# Patient Record
(demographics unavailable — no encounter records)

---

## 2024-11-10 NOTE — PHYSICAL EXAM
[Chaperone Present] : A chaperone was present in the examining room during all aspects of the physical examination [Appropriately responsive] : appropriately responsive [Alert] : alert [No Acute Distress] : no acute distress [No Lymphadenopathy] : no lymphadenopathy [Soft] : soft [Non-tender] : non-tender [Non-distended] : non-distended [No HSM] : No HSM [No Lesions] : no lesions [No Mass] : no mass [Oriented x3] : oriented x3 [Examination Of The Breasts] : a normal appearance [No Discharge] : no discharge [No Masses] : no breast masses were palpable [Labia Majora] : normal [Labia Minora] : normal [Normal] : normal [Uterine Adnexae] : normal [FreeTextEntry2] : LINDA Pedraza

## 2024-11-10 NOTE — HISTORY OF PRESENT ILLNESS
[FreeTextEntry1] : 23 year old female LMP: 09/12/24 present for follow-up GYN visit for missed menses. Sono shows FRANCIA 06/19/25. Pt reports the pregnancy test was positive Oct 15. Pt reports she is not related to the FOB. Pt denies babies born with abnormalities. Pt reports she lost her first baby in the third trimester - 9 months, during delivery the heartbeat stopped. Pt reports when she showed up to the hospital in labor, they were not able to find a heartbeat. Pt reports she saw the doctor 2-3 days and the heartbeat was present. Pt reports the baby's weight was between 2.5-3 KG, baby was a female. Pt reports she is not aware for the cause/reason for the demise. Pt reports during the pregnancy, she was tested for GDM but the results were negative, denies BP problems. Pt denies genetic testing on the fetus. Pt reports after the first pregnancy she had 2 miscarriages, where in the 2nd miscarriage was after she was able to hear the heartbeat. Pt reports baby did not grow past 6-7 weeks.  Pt reports her parents are both healthy. Pt reports she is not taking any PNV. Pt reports she is happy about the pregnancy. Pt denies abuse, and reports she feels safe. Pt reports her partner lives with her.    [PGHxTotal] : 3 [facility] [[facility]Living] : 0 [PGHxABSpont] : 2

## 2024-11-10 NOTE — PLAN
[FreeTextEntry1] : - Patient given practice letter and [facility] pregnancy brochure. The group's support staff and coverage management discussed. - Discussed nutrition and exercise. - Reviewed prenatal screening including amnio, CVS, NIPS and nuchal. - Referral given for nuchal. - HPV/ PAP done today. - Rx for PNV and bASA given.  - Routine OB bloods today.  - Discussed timeline for monitoring.  - Consultation with MFM given due to hx of 9 month fetus passing away.  - RTO in 4 weeks.

## 2024-11-10 NOTE — END OF VISIT
[FreeTextEntry3] : This note was written by Magaly Sharma on 11/04/2024 actively solely Paula Flynn M.D.  All medical record entries made by this scribe at my, Paula Flynn M.D direction and personally dictated by me on 11/04/2024. I have personally reviewed the chart and agree that the record reflects my personal performance of the history, physical exam, assessment, and plan.

## 2024-11-10 NOTE — HISTORY OF PRESENT ILLNESS
[FreeTextEntry1] : 23 year old female LMP: 09/12/24 present for follow-up GYN visit for missed menses. Sono shows FRANCIA 06/19/25. Pt reports the pregnancy test was positive Oct 15. Pt reports she is not related to the FOB. Pt denies babies born with abnormalities. Pt reports she lost her first baby in the third trimester - 9 months, during delivery the heartbeat stopped. Pt reports when she showed up to the hospital in labor, they were not able to find a heartbeat. Pt reports she saw the doctor 2-3 days and the heartbeat was present. Pt reports the baby's weight was between 2.5-3 KG, baby was a female. Pt reports she is not aware for the cause/reason for the demise. Pt reports during the pregnancy, she was tested for GDM but the results were negative, denies BP problems. Pt denies genetic testing on the fetus. Pt reports after the first pregnancy she had 2 miscarriages, where in the 2nd miscarriage was after she was able to hear the heartbeat. Pt reports baby did not grow past 6-7 weeks.  Pt reports her parents are both healthy. Pt reports she is not taking any PNV. Pt reports she is happy about the pregnancy. Pt denies abuse, and reports she feels safe. Pt reports her partner lives with her.    [PGHxTotal] : 3 [[facility]] : 1 [[facility]] : 0 [PGHxABSpont] : 2

## 2024-11-10 NOTE — PLAN
[FreeTextEntry1] : - Patient given practice letter and [facility] pregnancy brochure. The group's support staff and coverage management discussed. - Discussed nutrition and exercise. - Reviewed prenatal screening including amnio, CVS, NIPS and nuchal. - Referral given for nuchal. - HPV/ PAP done today. - Rx for PNV and bASA given.  - Routine OB bloods today.  - Discussed timeline for monitoring.  - Consultation with MFM given due to hx of 9 month fetus passing away.  - RTO in 4 weeks.

## 2025-07-21 NOTE — SIGNATURES
[TextEntry] :   I, Javier Power, acted as a scribe on behalf of Dr. Paula Colindres M.D. on 07/21/2025.     All medical entries made by the scribe were at my Dr. Paula Colindres M.D direction and personally dictated by me on 07/21/2025. I have reviewed the chart and agree that the record accurately reflects my personal performance of the history, physical exam, assessment and plan. I have also personally directed, reviewed, and agreed with the chart.

## 2025-07-21 NOTE — ASSESSMENT
[TextEntry] : 24 year  year old female presents today for postpartum visit. The pt is doing well, is showing no signs of infection and has excellent pain control. No signs of postpartum depression.  Pt is still breastfeeding. Emphasized need to use contraception, i.e. at least condoms if does not plan to conceive right away

## 2025-07-21 NOTE — HISTORY OF PRESENT ILLNESS
[Postpartum Follow Up] : postpartum follow up [Delivery Date: ___] : on [unfilled] [] : delivered by vaginal delivery [Male] : Delivery History: baby boy [Boy] : baby is a boy [Infant's Name ___] : [unfilled] [___ Lbs] : [unfilled] lbs [Not Circumcised] : not circumcised [Living at Home] : is currently living at home [Bottle Feeding] : bottle feeding [Breastfeeding] : currently nursing [Rhogam] : Rhogam administered [Back to Normal] : is back to normal in size [Mild] : mild vaginal bleeding [Normal] : the vagina was normal [Not Done] : Examination of breasts not done [Doing Well] : is doing well [No Sign of Infection] : is showing no signs of infection [Excellent Pain Control] : has excellent pain control [None] : None [Complications:___] : no complications [Last Pap Date: ___] : Last Pap Date: [unfilled] [BF with Difficulty] : nursing without difficulty [Resumed Menses] : has not resumed her menses [Resumed [place]] : has not resumed intercourse [Intended Contraception] : the patient does not intended to use contraception postpartum [S/Sx PP Depression] : no signs/symptoms of postpartum depression [Erythema] : not erythematous [Cervix Sample Taken] : cervical sample not taken for a Pap smear